# Patient Record
Sex: FEMALE | Race: WHITE
[De-identification: names, ages, dates, MRNs, and addresses within clinical notes are randomized per-mention and may not be internally consistent; named-entity substitution may affect disease eponyms.]

---

## 2017-02-27 ENCOUNTER — HOSPITAL ENCOUNTER (OUTPATIENT)
Dept: HOSPITAL 74 - JASU-SURG | Age: 72
Discharge: HOME | End: 2017-02-27
Attending: OBSTETRICS & GYNECOLOGY
Payer: COMMERCIAL

## 2017-02-27 VITALS — SYSTOLIC BLOOD PRESSURE: 148 MMHG | DIASTOLIC BLOOD PRESSURE: 86 MMHG | HEART RATE: 72 BPM

## 2017-02-27 VITALS — BODY MASS INDEX: 21.4 KG/M2

## 2017-02-27 VITALS — TEMPERATURE: 97.9 F

## 2017-02-27 DIAGNOSIS — N95.0: Primary | ICD-10-CM

## 2017-02-27 PROCEDURE — 0UDB8ZX EXTRACTION OF ENDOMETRIUM, VIA NATURAL OR ARTIFICIAL OPENING ENDOSCOPIC, DIAGNOSTIC: ICD-10-PCS | Performed by: OBSTETRICS & GYNECOLOGY

## 2017-02-27 NOTE — OP
DATE OF OPERATION:  02/27/2017

 

PREOPERATIVE DIAGNOSIS:  Postmenopausal bleeding.

 

POSTOPERATIVE DIAGNOSIS:  Postmenopausal bleeding.

 

OPERATIVE PROCEDURE:  Hysteroscopy, dilation and curettage, endocervical curettage.

 

SURGEON:  Leonela Mi MD

 

ANESTHESIA:  MAC.

 

ANESTHESIOLOGIST:  Paula Singh MD

 

ESTIMATED BLOOD LOSS:  5 mL

 

DESCRIPTION OF PROCEDURE:  The patient was brought to the operating room, placed in

the supine position, given anesthesia by Dr. Singh, placed in the lithotomy

position, prepped and draped in the usual manner.  The patient was examined.  The

uterus was noted to be within normal limits for the patient's age.  The speculum was

placed into the vagina after the vagina and the vulva were prepped and draped.  The

anterior lip of the cervix was grasped with a tenaculum.  The uterus was sounded to 7

cm.  The cervix was dilated with Antunez dilators.  Hysteroscopy was performed. 

Hysteroscopy revealed an atrophic endometrium.  After this was accomplished, a D and

C was carried out with a small curette.  Scant tissue was obtained.  This was

followed by an endocervical curettage.  Again, scant tissue was obtained.  

 

The patient tolerated the procedures well.  Her vital signs were stable.  Hemostasis

was good.  She was then transferred to the recovery room in good condition.

 

 

LEONELA MI M.D.

 

SANTOSH/[id number]

DD: 02/27/2017 08:30

DT: 02/27/2017 11:18

Job #:  [id number]

## 2017-03-01 NOTE — PATH
Surgical Pathology Report



Patient Name:  VIVIENNE THOMPSON

Accession #:  

[facility]. Rec. #:  [id number]                                                        

   /Age/Gender:  1945 (Age: 71) / F

Account:  [id number]                                                          

             Location: [facility] SURGICAL

Taken:  2017

Received:  2017

Reported:  3/1/2017

Physicians:  Td Mi M.D.

  



Specimen(s) Received

A: ENDOMETRIAL CURETTINGS 

B: ENDOCERVICAL CURETTINGS 





Clinical History

Postmenopausal bleeding







Final Diagnosis

A. ENDOMETRIUM, CURETTAGE:  

MINUTE STRIP OF ATROPHIC ENDOMETRIUM.

FRAGMENTS OF BENIGN SQUAMOUS EPITHELIUM WITH ATROPHIC CHANGES.

FRAGMENTS OF BENIGN ENDOCERVICAL TISSUE.



B. ENDOCERVIX, CURETTAGE:  

FRAGMENTS OF BENIGN SQUAMOUS EPITHELIUM WITH ATROPHIC CHANGES.



Comment: Immunohistochemical stains for p16 and Ki67 performed at [place], [place] ([id number]) on block A1 and interpreted at [facility] show negative p16 staining and no increased Ki67 stain

supporting atrophic changes.





***Electronically Signed***

Alexander Finkelstein, M.D.





Gross Description

A. Received in formalin, labeled "endometrial curettings" is a 0.4 x 0.3 x 0.1

cm aggregate of tan-brown soft tissue fragments. The formalin is filtered and

the specimen is entirely submitted in one cassette.



B. Received in formalin, labeled "endocervical curettings" is a 0.2 x 0.2 x 0.1

cm aggregate of tan soft tissue fragments. The formalin is filtered and the

specimen is entirely submitted in one cassette.

## 2018-03-23 NOTE — HP
[facility]





- Chief Complaint


Chief Complaint: The patient is admitted to investigate postmenopausal bleeding.


History Source: Patient


Limitations to Obtaining History: No Limitations





- Past Medical History


Allergies/Adverse Reactions: 


 Allergies











Allergy/AdvReac Type Severity Reaction Status Date / Time


 


Penicillins Allergy Intermediate Elevated Verified 02/24/17 12:01





   Blood  





   Pressure  


 


Sulfa (Sulfonamide Allergy Intermediate Hives Verified 02/24/17 12:01





Antibiotics)     


 


epinephrine AdvReac Intermediate  Verified 02/24/17 12:01











CNS: No: Alzheimer's, CVA, Dementia, Migraine, Multiple Sclerosis, Peripheral 

Neuropathy, Parkinson's, Seizure, Syncope, TIA, Vertigo, Other


Cardiovascular: No: AFIB, Aneurysm, Aortic Insufficiency, Aortic Stenosis, CAD, 

CHF, Deep Vein Thrombosis, HTN, Hyperlipdemia, MI, Mitral Insufficiency, Mitral 

Stenosis, Murmur, Pulmonary Hypertension, Other


Pulmonary: No: Asthma, Bronchitis, Cancer, COPD, O2 Dependent, Pneumonia, 

Previously Intubated, Pulmonary Embolus, Pulmonary Fibrosis, Sleep Apnea, Other


Gastrointestinal: No: Ascites, Cancer, Constipation, Crohn's Disease, 

Diverticulitis, Diverticulosis, Esophageal Varices, Gastritis, GERD, GI Bleed, 

Hemorrhoids, Hiatal Hernia, Inflamatory Bowel Disease, Irritable Bowel Disease, 

Pancreatitis, Peptic Ulcer Disease, Ulcerative Colitis, Other


Hepatobiliary: No: Cirrhosis, Cholelithiasis, Cholecystitis, Choledocholithiasis

, Hepatitis A, Hepatitis B, Hepatitis C, Other


Renal/: No: Renal Failure, Renal Inusuff, BPH, Cancer, Hematuria, Hemodialysis

, Neurogenic Bladder, Renal Calculi, UTI, Other


Reproductive: No: Ectopic Pregnancy, Endometriosis, Fibroids, PID, Polycystic 

Ovary Syndrome, Postmenopausal, Other


Heme/Onc: No: Anemia, B12 Deficiency, Bleeding Disorder, Cancer, Current 

Chemotherapy, Current Radiation Therapy, Hemochromatosis, Hypercoaguable State, 

Myeloproliferative Synd, Sickle Cell Disease, Sickle Cell Trait, 

Thrombocytopenia, Other


Infectious Disease: No: AIDS, C-Diff, Herpes Zoster, HIV, MRSA, STD's, 

Tuberculosis, VREF, Other


Musculoskeletal: No: Bursitis, Chronic low back pain, Hemiparesis, Hemiplegia, 

Osteoarthritis, Paraplegia, Other


Rheumatology: No: Fibromyalgia, Gout, Lupus, Rheumatoid Arthritis, Sarcoidosis, 

Vasculitis, Other


ENT: No: Allergic Rhinitis, Sinusitis, Other


Endocrine: No: Frederick's Disease, Cushing's Disease, Diabetes Insipidus, 

Diabetes Mellitus, Hyperparathyroidism, Hyperthyroidism, Hypothyroidism, 

Osteopenia, SIADH, Other


Dermatology: No: Basal Cell, Cellulitis, Eczema, Melanoma, Psoriasis, Squamous 

Cell, Other





- Current Medications


Current Medications: 


 Home Medications











 Medication  Instructions  Recorded


 


Ascorbic Acid [Vitamin C -] 500 mg PO DAILY 08/09/13


 


Calcium Carbonate [Calcium] 500 mg PO DAILY 08/09/13


 


Glucosa Rose 2Kcl/Chondroitin Rose 1 each PO DAILY 08/09/13





[Glucosamine Chondroitin Caplet]  


 


Famotidine [Pepcid -] 20 mg PO DAILY #30 tablet 10/17/14


 


Krill Oil 500 mg PO DAILY 09/06/16


 


L.acidoph,Paracasei, B.lactis 1 each PO DAILY 09/06/16





[Probiotic]  


 


Multivitamin,Ther and Minerals 1 tab PO DAILY 09/06/16





[Vitamin and Minerals]  














Satellite Physical Exam





- Physical Examination


General Appearance: Well Nourished, Well Developed, Alert & Oriented x3


ENT: Clear, No Discharge, No masses


Lung: Clear to auscultation


Heart: Regular rate & rhythm, Normal S1, Normal S2


Breasts: Soft, Non-Tender, No masses bilaterally


Abdomen: Soft, No tenderness, No CVA


Extremities: No edema, No tenderness/swelling


Pelvic Exam: Within normal limits External Genitalia, Within normal limits 

Vagina, Within normal limits Cervix, Within normal limits Uterus, Within normal 

limits Adenexa


Neurological: Intact, Alert, Oriented





Satellite Impression/Plan





- Impression/Plan


Impression: Postmenopausal bleeding


Operative Procedure: Hysteroscopy and D/C


Date to be Performed: 02/27/17 You can access the [facility], offered by [facility], by registering with the following website: [email or web address]

## 2019-01-02 ENCOUNTER — HOSPITAL ENCOUNTER (OUTPATIENT)
Dept: HOSPITAL 74 - JASU-ENDO | Age: 74
Discharge: HOME | End: 2019-01-02
Attending: INTERNAL MEDICINE
Payer: COMMERCIAL

## 2019-01-02 VITALS — DIASTOLIC BLOOD PRESSURE: 75 MMHG | SYSTOLIC BLOOD PRESSURE: 131 MMHG

## 2019-01-02 VITALS — BODY MASS INDEX: 21.9 KG/M2

## 2019-01-02 VITALS — HEART RATE: 78 BPM

## 2019-01-02 VITALS — TEMPERATURE: 97.5 F

## 2019-01-02 DIAGNOSIS — K21.0: ICD-10-CM

## 2019-01-02 DIAGNOSIS — K31.7: ICD-10-CM

## 2019-01-02 DIAGNOSIS — K29.50: ICD-10-CM

## 2019-01-02 DIAGNOSIS — K31.89: ICD-10-CM

## 2019-01-02 DIAGNOSIS — K29.80: Primary | ICD-10-CM

## 2019-01-02 PROCEDURE — 0DB68ZX EXCISION OF STOMACH, VIA NATURAL OR ARTIFICIAL OPENING ENDOSCOPIC, DIAGNOSTIC: ICD-10-PCS | Performed by: INTERNAL MEDICINE

## 2019-01-02 PROCEDURE — 0DB98ZX EXCISION OF DUODENUM, VIA NATURAL OR ARTIFICIAL OPENING ENDOSCOPIC, DIAGNOSTIC: ICD-10-PCS | Performed by: INTERNAL MEDICINE

## 2019-01-03 NOTE — PATH
Surgical Pathology Report



Patient Name:  VIVIENNE THOMPSON

Accession #:  [id number]

[facility]. Rec. #:  [id number]                                                        

   /Age/Gender:  1945 (Age: 73) / F

Account:  [id number]                                                          

             Location: [facility]-ENDOSCOPY

Taken:  2019

Received:  2019

Reported:  1/3/2019

Physicians:  Kyle Chung M.D.

  



Specimen(s) Received

A: BX 2ND PORTION DUODENUM AND DUODENAL BULB 

B: BX ANTRUM 

C: BX BODY POLYP 





Clinical History

Abdominal pain, dyspepsia, heartburn

Postoperative diagnosis: GERD, gastric polyp







Final Diagnosis

A. DUODENUM, SECOND PORTION AND DUODENAL BULB, BIOPSY:

DUODENAL MUCOSA WITH MILD CHRONIC DUODENITIS AND MILD BRUNNER'S GLAND

HYPERPLASIA.



B. STOMACH, ANTRUM, BIOPSY:

GASTRIC ANTRAL MUCOSA WITH MILD CHRONIC GASTRITIS. IMMUNOHISTOCHEMICAL STAIN FOR

H. PYLORI IS NEGATIVE.



C. STOMACH, BODY, POLYP, BIOPSY:

FUNDIC GLAND POLYP.

IMMUNOHISTOCHEMICAL STAIN FOR H. PYLORI IS NEGATIVE.







***Electronically Signed***

Mica Martinez M.D.





Gross Description

A.  Received in formalin, labeled "biopsy second portion of duodenum and

duodenal bulb" are 4 tan, irregular portions of soft tissue ranging from 0.2-0.3

cm. in greatest dimension. The specimens are submitted in toto in one cassette.



B.  Received in formalin, labeled "biopsy antrum" is a tan, irregular portion of

soft tissue measuring 0.4 cm. in greatest dimension. The specimen is submitted

in toto in one cassette.



C.  Received in formalin, labeled "biopsy gastric body polyp" are 3 tan,

irregular portions of soft tissue ranging from 0.2-0.3 cm. in greatest

dimension. The specimens are submitted in toto in one cassette.

DL/2019



saudi

## 2022-01-18 ENCOUNTER — HOSPITAL ENCOUNTER (OUTPATIENT)
Dept: HOSPITAL 74 - JASU-ENDO | Age: 77
Discharge: HOME | End: 2022-01-18
Attending: INTERNAL MEDICINE
Payer: COMMERCIAL

## 2022-01-18 VITALS — BODY MASS INDEX: 22.4 KG/M2

## 2022-01-18 VITALS — TEMPERATURE: 100 F

## 2022-01-18 VITALS — DIASTOLIC BLOOD PRESSURE: 86 MMHG | SYSTOLIC BLOOD PRESSURE: 152 MMHG | HEART RATE: 75 BPM

## 2022-01-18 DIAGNOSIS — D12.2: ICD-10-CM

## 2022-01-18 DIAGNOSIS — K63.89: ICD-10-CM

## 2022-01-18 DIAGNOSIS — K57.30: ICD-10-CM

## 2022-01-18 DIAGNOSIS — Z86.010: ICD-10-CM

## 2022-01-18 DIAGNOSIS — Z12.11: Primary | ICD-10-CM

## 2022-01-18 DIAGNOSIS — Z80.0: ICD-10-CM

## 2022-01-18 DIAGNOSIS — D12.3: ICD-10-CM

## 2022-01-18 PROCEDURE — 0DBL8ZX EXCISION OF TRANSVERSE COLON, VIA NATURAL OR ARTIFICIAL OPENING ENDOSCOPIC, DIAGNOSTIC: ICD-10-PCS | Performed by: INTERNAL MEDICINE

## 2022-01-18 PROCEDURE — 0DBK8ZX EXCISION OF ASCENDING COLON, VIA NATURAL OR ARTIFICIAL OPENING ENDOSCOPIC, DIAGNOSTIC: ICD-10-PCS | Performed by: INTERNAL MEDICINE
